# Patient Record
Sex: FEMALE | Race: WHITE | NOT HISPANIC OR LATINO | Employment: UNEMPLOYED | URBAN - METROPOLITAN AREA
[De-identification: names, ages, dates, MRNs, and addresses within clinical notes are randomized per-mention and may not be internally consistent; named-entity substitution may affect disease eponyms.]

---

## 2020-05-22 ENCOUNTER — OFFICE VISIT (OUTPATIENT)
Dept: SURGERY | Facility: CLINIC | Age: 25
End: 2020-05-22

## 2020-05-22 VITALS — HEIGHT: 65 IN | WEIGHT: 245 LBS | BODY MASS INDEX: 40.82 KG/M2

## 2020-05-22 DIAGNOSIS — N64.52 NIPPLE DISCHARGE IN FEMALE: Primary | ICD-10-CM

## 2020-05-22 PROCEDURE — 99203 OFFICE O/P NEW LOW 30 MIN: CPT | Performed by: SURGERY

## 2020-05-22 RX ORDER — ACETAMINOPHEN AND CODEINE PHOSPHATE 120; 12 MG/5ML; MG/5ML
1 SOLUTION ORAL DAILY
COMMUNITY
Start: 2020-03-23

## 2020-05-22 NOTE — PROGRESS NOTES
"Kelly Rodriguez is a 24 y.o. female.   Chief Complaint   Patient presents with   • Mass     right breast lump with nipple discharge     Ht 165.1 cm (65\")   Wt 111 kg (245 lb)   BMI 40.77 kg/m²     HISTORY OF PRESENT ILLNESS:  Patient is a very pleasant 24-year-old female.  She has had new onset bilateral nipple discharge.  And her OB/GYN thought there might be a palpable lump in the infra-areolar position of the right breast.  She reports that the nipple discharge is clear to white does not drip spontaneously and is bilateral with right greater than left.  She is a pack-a-day smoker.  She denies any significant family her breast cancer.  She does have heavy periods.      The following portions of the patient's history were reviewed and updated as appropriate: allergies, current medications, past family history, past medical history, past social history, past surgical history and problem list.    Review of Systems   Constitutional: Negative for activity change and diaphoresis.   HENT: Negative.  Negative for sore throat and voice change.    Eyes: Negative for blurred vision.   Respiratory: Negative for wheezing.    Cardiovascular: Negative for chest pain.   Endocrine: Negative.  Negative for polyuria.   Genitourinary: Negative for urinary incontinence.   Musculoskeletal: Negative for arthralgias.   Skin: Negative for color change.   Neurological: Negative for dizziness, speech difficulty and confusion.   Hematological: Does not bruise/bleed easily.   Psychiatric/Behavioral: Negative for agitation.       Objective   Physical Exam   Constitutional: She is oriented to person, place, and time. She appears well-developed and well-nourished.   HENT:   Head: Normocephalic and atraumatic.   Eyes: EOM are normal.   Neck: Normal range of motion.   Cardiovascular: Normal rate.   Pulmonary/Chest: Effort normal.   Bilateral breast without skin changes nipple discharge or dominant mass.  No axillary or supraclavicular " lymphadenopathy.  Specifically I could not express any nipple discharge from the right breast.   Abdominal: Soft.   Musculoskeletal: Normal range of motion.   Neurological: She is alert and oriented to person, place, and time.   Skin: Skin is warm and dry.   Psychiatric: She has a normal mood and affect.         Assessment/Plan   Taylor was seen today for mass.    Diagnoses and all orders for this visit:    Nipple discharge in female    I believe this discharge is physiologic.  Her imaging was negative.  Her physical exam is negative.  Her prolactin level was within the normal range.  I encouraged her to continue to follow with her primary provider and follow-up with me as needed.  I have also counseled her on the importance of smoking cessation.  And a suitable method to accomplish this.    Carrie Tovar MD  5/22/2020  3:05 PM